# Patient Record
(demographics unavailable — no encounter records)

---

## 2025-05-20 NOTE — REASON FOR VISIT
[Follow-Up: _____] : a [unfilled] follow-up visit [FreeTextEntry1] :  left 12:00 atypical lobular hyperplasia s/p left breast excisional biopsy on 3/13/2020; ROSAURA lifetime risk is 31.3%

## 2025-05-20 NOTE — REVIEW OF SYSTEMS
[Fever] : no fever [Chills] : no chills [As Noted in HPI] : as noted in HPI [Breast Pain] : no breast pain [Breast Lump] : no breast lump [Negative] : Integumentary [FreeTextEntry2] : fatigue

## 2025-05-20 NOTE — ASSESSMENT
[FreeTextEntry1] : 69 yo female with a history of ALH and family history of breast cancer presents for high risk screening.  She is overall doing well, has no breast complaints today.   Discussed with the patient the implications of their lifetime risk, which is considered to be elevated for the development of breast cancer over the span of their lifetime. It was explained that it is recommended that they undergo high risk screening surveillance to include biannual radiological screening exams with a mammogram and screening MRI.   The patient states that both her gynecologist Dr. Malloy perform breast exams. Will plan on continuing to alternate breast exams with Dr. Malloy and her gynecologist to reduce appointment burden  Reviewed imaging plan: MRI in August, and if benign mammogram/ultrasound in February & RTC after for re-examination. The patient states that her insurance is no longer covering screening ultrasounds; will place the order but discussed it is OK if she skips the screening ultrasound moving forward given insurance issues.   Discussed the importance of breast self awareness. Encouraged the patient to become familiar with her tissue so as to be aware of any changes from her baseline.  All questions were answered; the patient verbalized understanding and is in agreement with the plan.

## 2025-05-20 NOTE — PAST MEDICAL HISTORY
[Postmenopausal] : The patient is postmenopausal [Menarche Age ____] : age at menarche was [unfilled] [Menopause Age____] : age at menopause was [unfilled] [Approximately ___] : the LMP was approximately [unfilled] [Total Preg ___] : G[unfilled] [Live Births ___] : P[unfilled]  [Living ___] : Living: [unfilled] [History of Hormone Replacement Treatment] : has no history of hormone replacement treatment [FreeTextEntry5] :  [FreeTextEntry6] : none [FreeTextEntry7] : none [FreeTextEntry8] : yes, for about 8 months with the first child, and for about 8 months

## 2025-05-20 NOTE — DATA REVIEWED
[FreeTextEntry1] : --8/25/18 ([facility]) b/l breast US showing no suspicious nodules or adenopathy, negative results BI-RADS 1   --12/28/18 ([facility]) b/l screening mammogram showing heterogeneously dense breast tissue, no mammographic evidence of malignancy. Negative findings. BIRADS 1.  --12/9/19 ([facility]) breast MRI: 0.5 enhancing mass at 12:00 in the left breast, MRI guided biopsy is recommended. Pending benign pathology, 6-mo f/u b/l breast MRI is recommended for probably benign 0.4 cm focus at 9:00 in the right breast. BI-RADS 4.   --12/30/19 ([facility]) b/l mmg & US:no mmg evidence of malignancy. Left, small hypoechoic lesion at 11n2, possibly correlating w/ small lesion seen on MRI and described at 12:00. Recommend US core biopsy. If clip does not correlate to lesion, MRI guided biopsy is recommended. BI-RADS 4.   --1/16/2020 ([facility]) left breast MRI biopsy path: FCC a/w calcifications. Atypical lobular hyperplasia.   --3/13/2020 final surgical path form excisional biopsy: revealed breast tissue w/ radial sclerosing lesion adjacent to biopsy site changes, FCC including sclerosing adenosis and fibroadenomatoid hyperplasia.  No more atypia was seen.   1/20/2021 ([facility]) bilateral screening mammogram/ultrasound showing heterogeneously dense breast tissue, In the right breast 12:00 position 2 cm from the nipple, there is a 0.5 cm cyst. No suspicious solid or complex cystic mass is detected in either breast. There is no pathological lymphadenopathy in either axilla. IMPRESSION: No mammographic or ultrasonographic evidence of malignancy. FOLLOW-UP: Annual screening. ASSESSMENT: BI-RADS Category 2: Benign.   6/3/2021 ([facility]) MRI breasts: no MR e/o malignancy. BI-RADS 1.   1/21/22 ([facility]) b/l Smmg and US: heterogenously dense. No mmg or US e/o malignancy. BI-RADS 2.  7/17/22 ([facility]) MRI: No e/o malignancy. Patient is due for annual mammo in January 2023. BI-RADS 1.   1/23/23 ([facility]) b/l screening mammo and ultrasound: heterogenously dense. Benign findings. No mammographic or ultrasonographic evidence of malignancy. BI-RADS 2.  7/10/23 ([facility]) MRI: No evidence of malignancy. BI-RADS 1.   2/5/24 ([facility]) b/l screening mammo and US: heterogeneously dense. Benign findings. No evidence of malignancy. BI-RADS 2.  7/15/2024 ([facility]) MRI: No evidence of malignancy. Recommend annual screening. BI-RADS 1.  2/10/25 ([facility]) b/l screening mammo: heterogeneously dense. No evidence of malignancy. BI-RADS 1

## 2025-05-20 NOTE — HISTORY OF PRESENT ILLNESS
[FreeTextEntry1] : Cooper is a 67 yo female who presents for follow-up high risk screening with history of left breast atypical lobular hyperplasia s/p left breast excisional biopsy on 3/13/2020 final surgical pathology revealed radial sclerosing lesion. Patient has no breast complaints today. Stopped exemestane under the guidance of Dr. Malloy, as it was causing depression and significant hot flashes. These symptoms have improved since she stopped the medication. Denies nipple discharge, nipple/breast skin changes or dimpling.  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 Since her last appointment, she has retired. Has been dealing with multiple [facility] health issues; recently had shoulder surgery which she has started PT for.

## 2025-05-20 NOTE — PHYSICAL EXAM
[Normocephalic] : normocephalic [Atraumatic] : atraumatic [No Supraclavicular Adenopathy] : no supraclavicular adenopathy [Examined in the supine and seated position] : examined in the supine and seated position [Symmetrical] : symmetrical [No dominant masses] : no dominant masses in right breast  [No dominant masses] : no dominant masses left breast [No Nipple Retraction] : no left nipple retraction [No Nipple Discharge] : no left nipple discharge [No Axillary Lymphadenopathy] : no left axillary lymphadenopathy [No Edema] : no edema [No Rashes] : no rashes [No Ulceration] : no ulceration [No Cervical Adenopathy] : no cervical adenopathy [Breast Nipple Inversion] : nipples not inverted [Breast Nipple Retraction] : nipples not retracted [Breast Nipple Flattening] : nipples not flattened [Breast Nipple Fissures] : nipples not fissured [de-identified] : limited right upper extremity ROM